# Patient Record
Sex: FEMALE | Race: WHITE
[De-identification: names, ages, dates, MRNs, and addresses within clinical notes are randomized per-mention and may not be internally consistent; named-entity substitution may affect disease eponyms.]

---

## 2021-04-20 ENCOUNTER — HOSPITAL ENCOUNTER (OUTPATIENT)
Dept: HOSPITAL 56 - MW.SDS | Age: 56
LOS: 1 days | Discharge: HOME | End: 2021-04-21
Attending: OBSTETRICS & GYNECOLOGY
Payer: COMMERCIAL

## 2021-04-20 DIAGNOSIS — F17.210: ICD-10-CM

## 2021-04-20 DIAGNOSIS — N93.8: ICD-10-CM

## 2021-04-20 DIAGNOSIS — N39.3: ICD-10-CM

## 2021-04-20 DIAGNOSIS — Z80.41: ICD-10-CM

## 2021-04-20 DIAGNOSIS — N81.2: Primary | ICD-10-CM

## 2021-04-20 DIAGNOSIS — Z98.890: ICD-10-CM

## 2021-04-20 LAB
BUN SERPL-MCNC: 14 MG/DL (ref 7–18)
CHLORIDE SERPL-SCNC: 107 MMOL/L (ref 98–107)
CO2 SERPL-SCNC: 29.8 MMOL/L (ref 21–32)
GLUCOSE SERPL-MCNC: 106 MG/DL (ref 74–106)
POTASSIUM SERPL-SCNC: 4.2 MMOL/L (ref 3.5–5.1)
SODIUM SERPL-SCNC: 143 MMOL/L (ref 136–145)

## 2021-04-20 PROCEDURE — 85027 COMPLETE CBC AUTOMATED: CPT

## 2021-04-20 PROCEDURE — C1771 REP DEV, URINARY, W/SLING: HCPCS

## 2021-04-20 PROCEDURE — 88307 TISSUE EXAM BY PATHOLOGIST: CPT

## 2021-04-20 PROCEDURE — 85025 COMPLETE CBC W/AUTO DIFF WBC: CPT

## 2021-04-20 PROCEDURE — 86901 BLOOD TYPING SEROLOGIC RH(D): CPT

## 2021-04-20 PROCEDURE — 36415 COLL VENOUS BLD VENIPUNCTURE: CPT

## 2021-04-20 PROCEDURE — 86850 RBC ANTIBODY SCREEN: CPT

## 2021-04-20 PROCEDURE — 80048 BASIC METABOLIC PNL TOTAL CA: CPT

## 2021-04-20 PROCEDURE — 58552 LAPARO-VAG HYST INCL T/O: CPT

## 2021-04-20 PROCEDURE — 88304 TISSUE EXAM BY PATHOLOGIST: CPT

## 2021-04-20 PROCEDURE — 84703 CHORIONIC GONADOTROPIN ASSAY: CPT

## 2021-04-20 PROCEDURE — 57240 ANTERIOR COLPORRHAPHY: CPT

## 2021-04-20 PROCEDURE — 57288 REPAIR BLADDER DEFECT: CPT

## 2021-04-20 PROCEDURE — 86900 BLOOD TYPING SEROLOGIC ABO: CPT

## 2021-04-20 RX ADMIN — OXYCODONE HYDROCHLORIDE AND ACETAMINOPHEN PRN TAB: 5; 325 TABLET ORAL at 19:53

## 2021-04-20 NOTE — PCM.POSTAN
POST ANESTHESIA ASSESSMENT





- MENTAL STATUS


Mental Status: Alert (no anesthetic problems), Oriented





- VITAL SIGNS


Vital Signs: 


                                Last Vital Signs











Temp  98.1 F   04/20/21 11:06


 


Pulse  81   04/20/21 11:46


 


Resp  14   04/20/21 11:46


 


BP  113/54 L  04/20/21 11:46


 


Pulse Ox  100   04/20/21 11:46














- RESPIRATORY


Respiratory Status: Respiratory Rate WNL, Airway Patent, O2 Saturation Stable





- CARDIOVASCULAR


CV Status: Pulse Rate WNL, Blood Pressure Stable





- GASTROINTESTINAL


GI Status: No Symptoms





- POST OP HYDRATION


Hydration Status: Adequate & Stable

## 2021-04-20 NOTE — PCM.PREANE
Preanesthetic Assessment





- Anesthesia/Transfusion/Family Hx


Anesthesia History: Prior Anesthesia Without Reaction


Family History of Anesthesia Reaction: No


Transfusion History: No Prior Transfusion(s)





- Review of Systems


General: No Symptoms


Pulmonary: No Symptoms


Cardiovascular: No Symptoms


Gastrointestinal: No Symptoms


Neurological: No Symptoms


Other: Reports: None





- Physical Assessment


NPO Status Date: 21


NPO Status Time: 00:01


Height: 5 ft 7 in


Weight: 166 lb


ASA Class: 2


Mental Status: Alert & Oriented x3


Airway Class: Mallampati = 2


Dentition: Reports: Normal Dentition


ROM/Head Extension: Full


Lungs: Clear to Auscultation, Normal Respiratory Effort


Cardiovascular: Regular Rate, Regular Rhythm





- Lab


Values: 





                             Laboratory Last Values











WBC  8.58 K/uL (4.0-11.0)   21  06:53    


 


RBC  4.59 M/uL (4.30-5.90)   21  06:53    


 


Hgb  15.3 g/dL (12.0-16.0)   21  06:53    


 


Hct  45.8 % (36.0-46.0)   21  06:53    


 


MCV  99.8 fL (80.0-98.0)  H  21  06:53    


 


MCH  33.3 pg (27.0-32.0)  H  21  06:53    


 


MCHC  33.4 g/dL (31.0-37.0)   21  06:53    


 


RDW Std Deviation  47.7 fl (28.0-62.0)   21  06:53    


 


RDW Coeff of Zora  13 % (11.0-15.0)   21  06:53    


 


Plt Count  268 K/uL (150-400)   21  06:53    


 


MPV  11.80 fL (7.40-12.00)   21  06:53    


 


Nucleated RBC %  0.0 /100WBC  21  06:53    


 


Nucleated RBCs #  0 K/uL  21  06:53    














- Allergies


Allergies/Adverse Reactions: 


                                    Allergies











Allergy/AdvReac Type Severity Reaction Status Date / Time


 


No Known Allergies Allergy   Verified 21 15:31














- Anesthesia Plan


Pre-Op Medication Ordered: None





- Acknowledgements


Anesthesia Type Planned: General Anesthesia


Pt an Appropriate Candidate for the Planned Anesthesia: Yes


Alternatives and Risks of Anesthesia Discussed w Pt/Guardian: Yes


Pt/Guardian Understands and Agrees with Anesthesia Plan: Yes


Additional Comments: 





npo after mn


tob  1/2 ppd


etoh  6 beers a weekend


no cv problems


par no questions


depression








PreAnesthesia Questionnaire


HEENT History: Reports: Other (See Below)


Other HEENT History: wears glasses/contacts,  has upper and lower removable 

partial dentures


Cardiovascular History: Reports: None


Respiratory History: Reports: None


Gastrointestinal History: Reports: None


Genitourinary History: Reports: None


OB/GYN History: Reports: Pregnancy


Musculoskeletal History: Reports: None


Neurological History: Reports: None


Psychiatric History: Reports: Depression


Endocrine/Metabolic History: Reports: None


Hematologic History: Reports: None


Immunologic History: Reports: None


Oncologic (Cancer) History: Reports: Basal Cell Carcinoma


Other Oncologic History: removed from chin





- Past Surgical History


Head Surgeries/Procedures: Reports: None


Cardiovascular Surgical History: Reports: None


Respiratory Surgical History: Reports: None


GI Surgical History: Reports: None


Female  Surgical History: Reports:  Section, Endometrial Ablation


Musculoskeletal Surgical History: Reports: None


Oncologic Surgical History: Reports: None


Dermatological Surgical History: Reports: Skin Biopsy





- SUBSTANCE USE


Tobacco Use Status *Q: Current Some Day Tobacco User


Tobacco Use Within Last Twelve Months: Cigarettes


Recreational Drug Use History: No





- HOME MEDS


Home Medications: 


                                    Home Meds





FLUoxetine [PROzac] 40 mg PO QAM 21 [History]











- CURRENT (IN HOUSE) MEDS


Current Meds: 





                               Current Medications





Lactated Ringer's (Ringers, Lactated)  1,000 mls @ 125 mls/hr IV ASDIRECTED YUNIEL


Sodium Chloride (Sodium Chloride 0.9% 10 Ml Syringe)  10 ml FLUSH ASDIRECTED PRN


   PRN Reason: Keep Vein Open


Sodium Chloride (Sodium Chloride 0.9% 2.5 Ml Syringe)  2.5 ml FLUSH ASDIRECTED 

PRN


   PRN Reason: Keep Vein Open


Sodium Chloride (Sodium Chloride 0.9% 10 Ml Sdv)  10 ml IV ASDIRECTED PRN


   PRN Reason: IV Use





Discontinued Medications





Dexamethasone (Dexamethasone 4 Mg/Ml 5 Ml Mdv) Confirm Administered Dose 20 mg 

.ROUTE .STK-MED ONE


   Stop: 21 07:02


Fentanyl (Fentanyl 250 Mcg/5 Ml Sdv) Confirm Administered Dose 250 mcg .ROUTE 

.STK-MED ONE


   Stop: 21 07:01


Cefazolin Sodium/Dextrose 2 gm (/ Premix)  50 mls @ 100 mls/hr IV ONETIME ONE


   Stop: 21 06:29


Lidocaine (Lidocaine 2% 5 Ml Sdv) Confirm Administered Dose 5 ml .ROUTE .STK-MED

 ONE


   Stop: 21 07:02


Midazolam HCl (Midazolam 1 Mg/Ml 2 Ml Sdv) Confirm Administered Dose 2 mg .ROUTE

 .STK-MED ONE


   Stop: 21 07:01


Ondansetron HCl (Ondansetron 4 Mg/2 Ml Sdv) Confirm Administered Dose 4 mg 

.ROUTE .STK-MED ONE


   Stop: 21 07:02


Propofol (Propofol 200 Mg/20 Ml Sdv) Confirm Administered Dose 200 mg .ROUTE 

.STK-MED ONE


   Stop: 21 07:01


Rocuronium Bromide (Rocuronium Bromide 50 Mg/5 Ml Syringe) Confirm Administered 

Dose 50 mg .ROUTE .STK-MED ONE


   Stop: 21 07:02

## 2021-04-21 LAB
BUN SERPL-MCNC: 7 MG/DL (ref 7–18)
CHLORIDE SERPL-SCNC: 105 MMOL/L (ref 98–107)
CO2 SERPL-SCNC: 29.8 MMOL/L (ref 21–32)
GLUCOSE SERPL-MCNC: 112 MG/DL (ref 74–106)
POTASSIUM SERPL-SCNC: 4.5 MMOL/L (ref 3.5–5.1)
SODIUM SERPL-SCNC: 141 MMOL/L (ref 136–145)

## 2021-04-21 RX ADMIN — OXYCODONE HYDROCHLORIDE AND ACETAMINOPHEN PRN TAB: 5; 325 TABLET ORAL at 05:28

## 2021-04-21 NOTE — OR
SURGEON:

Kit Hernandez MD

 

DATE OF PROCEDURE:  04/20/2021

 

INDICATION FOR PROCEDURE:

A 55-year-old female with history of stress urinary incontinence, cystocele

and uterine prolapse, presenting for scheduled surgery.  The patient has a

long history of stress incontinence, was evaluated in the office with cystometry

and confirmed to be a good candidate for a TOT sling.  She also had stage II

cystocele and stage I uterine prolapse. She has family history of ovarian

cancer with her mother at age 50s and she has been perimenopausal.  She 

desired to have a hysterectomy and BSO after discussion of risks and benefits.  

Attempted to perform endometrial biopsy in the office but was not able to due

to severe cervical stenosis.

 

PREOPERATIVE DIAGNOSES:

1. Stress incontinence.

2. Cystocele.

3. Family history of ovarian cancer.

 

POSTOPERATIVE DIAGNOSES:

1. Stress incontinence.

2. Cystocele.

3. Family history of ovarian cancer.

 

PROCEDURES PERFORMED:

Laparoscopic-assisted vaginal hysterectomy, bilateral salpingo-oophorectomy,

cystocele repair, transobturator vaginal tape, and cystoscopy.

 

ANESTHESIA:

General anesthesia.

 

ANESTHESIOLOGIST:

Dr. Kishan Price

 

ASSISTANT:

Tameka Bar M.D.

 

FINDINGS:

Normal-appearing uterus, fallopian tubes, and ovaries.  Stage II cystocele and

stage I uterine prolapse.

 

DESCRIPTION OF THE PROCEDURE:

Informed consent was obtained.  Risks of procedure including bleeding,

infection, DVTs, injury to surrounding organs such as bladder and bowel were

discussed.  The patient was taken to the operating room.  She was given 2 g

Ancef IV for infection prophylaxis.  SCD was placed.  She underwent general

anesthesia with no complications.  She was placed in dorsal lithotomy

position and her legs supported using stirrups careful to relieve all pressure 
points.

She was prepped and draped in the usual sterile fashion with chlorhexidine and

Betadine.  Clark catheter was placed.  Bimanual exam showed small anteverted

uterus measuring less than 6-week size.  No adnexal masses were felt.  A bivalve

speculum was placed in the vaginal vault to visualize the cervix.  It was

grasped with a single-tooth tenaculum and the cervical os was serially dilated 
with

Hegar dilators to 6 mm.  A HUMI uterine manipulator was placed in the uterus for

manipulation.  The tenaculum was removed.  

Attention was then turned to the abdomen.  A 5 mm incision was made 

at the umbilicus after injecting with 0.25% Marcaine for local anesthesia.  

A Veress needle was used for entry. Intraperitoneal location was confirmed in 

the usual fashion with saline drop test and low opening pressure.  CO2 gas 

was initiated and pneumoperitoneum to 15 mmHg was achieved.  A 5 mm trocar 

was placed under laparoscopic visualization. Intraperitoneal location was 
confirmed.

No visceral or vascular injury was noted at the entry site. Survey of the 
abdomen 

noted normal-appearing uterus, bilateral fallopian tubes and ovaries.  A 5 mm 

trocar was then placed in the right lower quadrant under laparoscopic 
visualization, 

followed by another 5 mm trocar in the left lower quadrant.  The patient was 

placed in Trendelenburg.  Blunt probe was used to sweep the bowel away from 

the pelvis.  Both ureters were noted to be peristalsing along the pelvic wall.  

The left fallopian tube was grasped with atraumatic grasper at the fimbriated 
end.  

The IP ligament was coagulated and transected with LigaSure device.  The 

fallopian tube was then  from the mesosalpinx with the LigaSure device.
 

The round ligament was cauterized and transected.  The broad ligament was 
dissected

towards the cervix until the level of the cardinal ligaments.  A bladder flap 
was then

carefully made by dissecting the anterior vesicouterine peritoneum and

 the bladder away from the underlying pubocervical fascia.  The same

procedure was performed on the right side.  Hemostasis was confirmed.  

Attention was then turned to the vaginal portion of the procedure.  A weighted 
speculum

was placed in the posterior vaginal vault and right angle retractors used for 
the 

vaginal walls.  The cervix was grasped with 2 David clamps.  A circumferential 

incision was made with Bovie cautery at the cervicovaginal junction with 

downward traction.  The vagina was then bluntly dissected away from the cervix. 


The anterior peritoneum was visualized and carefully dissected with Metzenbaum 

scissors to enter the peritoneal cavity and the location confirmed by palpation.
 

The peritoneum was also carefully dissected in the posterior cul-de-sac and 

entered with Metzenbaum scissors.  A gooseneck speculum was placed 

in the cul-de-sac.  The uterosacral ligament was clamped with Ana clamp, 

then cut and ligated with 2-0 Vicryl suture bilaterally.  There was some 

attachment on the right side of the uterus that was grasped with Ana 

clamps and ligated.  The uterus was completely freed up and brought out of 

the vagina and sent to Pathology. The pedicles were carefully examined and 

noted to be hemostatic.  

The cystocele repair was then performed.  Two Allis clamps were placed at the

anterior edge of the vaginal cuff.  Saline was used for hydrodissection along 

the midline of the vaginal mucosa. A vertical incision was made.  The bladder 

was carefully dissected away from the vaginal mucosa bilaterally with blunt 
dissection 

and Metzenbaum scissors. The cystocele was then reduced by bringing the 
underlying 

connective tissue together at the midline using interrupted 2-0 Vicryl. 1 cm 
width of 

redundant mucosa was trimmed on both sides of the vagina.  The vaginal incision 
was 

closed with 0 Vicryl suture in running locking fashion.  The suture that was 

previously used to ligate the uterosacral ligament was attached to the lateral 

vaginal cuff with a figure of eight stitch for pelvic support.  The vaginal cuff


was then closed entirely and hemostasis was confirmed.  A cystoscopy was 

performed after injection of fluorescein and lasix.  The bladder was normal 

appearing without any signs of injury and both ureteral jets were visualized.  

The abdomen was again insufflated with CO2.  The pelvis was copiously

irrigated. The vaginal cuff and all pedicles were examined and noted to be 

hemostatic.  Pneumoperitoneum was relieved, and all instruments

were removed.  

The transobturator sling was then performed.  0.5cm incision was

made bilaterally on the lateral thigh below the tendon of the obturator longus 
muscle 

above at the obturator foramen. A mixture of 1% lidocaine and 0.25% Marcaine 
with

saline was used for anesthesia using a spinal needle and injected along the 

obturator canal at the route of the transobturator sling.  A 1cm vaginal 
incision was 

made after performing a hydrodissection along the vaginal mucosa.  It was then 

carefully dissected toward the obturator foramen with Metzenbaum scissors 

and palpated digitally for proper position.  The curved needle was passed from 

the groin incision through the obturator canal and the vaginal incision under 

direct palpation.  The mesh was placed on the needle and brought out from the 

thigh incisions.  The same procedure was performed bilaterally.  The mesh was 

carefully examined and made sure it was lying flat.  The tension of the sling 
was 

then adjusted so there was a small amount of space under the urethra.  The 
bladder 

was then backfilled with a solution with Neosporin. Valsalva and suprapubic 
pressure was

performed and there was no incontinence noted.  The mesh was then

irrigated with the Neosporin solution.  The excess mesh was trimmed.  The

groin incisions were closed with Dermabond.  The vaginal incision was closed

with 2-0 Vicryl suture in running fashion.  Cystoscopy was again performed and 

there was no bladder injury noted.  The Clark catheter was replaced and vaginal 

packing was placed.

The laparoscopic incisions were then closed with 3-0 monocryl in usual fashion.

Dermabond was placed over the incision.  The patient tolerated the procedure

well.  She did have episodes of bradycardia during the procedure, but recovered 

after position was adjusted.  She was awakened from anesthesia and returned 

to the recovery room in stable condition.

 

 

YUMIKO SARAH

DD:  04/21/2021 00:35:11

DT:  04/21/2021 05:40:24

Job #:  [id number]

MTDD

## 2021-04-21 NOTE — PCM.PN
- General Info


Date of Service: 04/21/21


Functional Status: Reports: Pain Controlled, Tolerating Diet, Ambulating





- Review of Systems


General: Reports: No Symptoms


HEENT: Reports: No Symptoms


Pulmonary: Reports: No Symptoms


Cardiovascular: Reports: No Symptoms


Gastrointestinal: Reports: No Symptoms


Genitourinary: Reports: No Symptoms


Musculoskeletal: Reports: No Symptoms


Skin: Reports: No Symptoms


Neurological: Reports: No Symptoms


Psychiatric: Reports: No Symptoms





- Patient Data


Vitals - Most Recent: 


                                Last Vital Signs











Temp  36.5 C   04/21/21 05:15


 


Pulse  74   04/21/21 05:15


 


Resp  16   04/21/21 05:15


 


BP  122/66   04/21/21 05:15


 


Pulse Ox  95   04/21/21 05:15











Weight - Most Recent: 166 lb


I&O - Last 24 Hours: 


                                 Intake & Output











 04/20/21 04/21/21 04/21/21





 22:59 06:59 14:59


 


Intake Total 850  


 


Output Total 450 750 


 


Balance 400 -750 











Lab Results Last 24 Hours: 


                         Laboratory Results - last 24 hr











  04/21/21 04/21/21 Range/Units





  05:50 05:50 


 


WBC  12.83 H   (4.0-11.0)  K/uL


 


RBC  3.75 L   (4.30-5.90)  M/uL


 


Hgb  12.2   (12.0-16.0)  g/dL


 


Hct  38.0   (36.0-46.0)  %


 


MCV  101.3 H   (80.0-98.0)  fL


 


MCH  32.5 H   (27.0-32.0)  pg


 


MCHC  32.1   (31.0-37.0)  g/dL


 


RDW Std Deviation  48.5   (28.0-62.0)  fl


 


RDW Coeff of Zora  13   (11.0-15.0)  %


 


Plt Count  235   (150-400)  K/uL


 


MPV  11.60   (7.40-12.00)  fL


 


Neut % (Auto)  65.1   (48.0-80.0)  %


 


Lymph % (Auto)  28.4   (16.0-40.0)  %


 


Mono % (Auto)  6.2   (0.0-15.0)  %


 


Eos % (Auto)  0.2   (0.0-7.0)  %


 


Baso % (Auto)  0.1   (0.0-1.5)  %


 


Neut # (Auto)  8.4 H   (1.4-5.7)  K/uL


 


Lymph # (Auto)  3.6 H   (0.6-2.4)  K/uL


 


Mono # (Auto)  0.8   (0.0-0.8)  K/uL


 


Eos # (Auto)  0.0   (0.0-0.7)  K/uL


 


Baso # (Auto)  0.0   (0.0-0.1)  K/uL


 


Nucleated RBC %  0.0   /100WBC


 


Nucleated RBCs #  0   K/uL


 


Sodium   141  (136-145)  mmol/L


 


Potassium   4.5  (3.5-5.1)  mmol/L


 


Chloride   105  ()  mmol/L


 


Carbon Dioxide   29.8  (21.0-32.0)  mmol/L


 


BUN   7  (7.0-18.0)  mg/dL


 


Creatinine   0.8  (0.6-1.0)  mg/dL


 


Est Cr Clr Drug Dosing   78.18  mL/min


 


Estimated GFR (MDRD)   > 60.0  ml/min


 


Glucose   112 H  ()  mg/dL


 


Calcium   8.1 L  (8.5-10.1)  mg/dL











Med Orders - Current: 


                               Current Medications





Lactated Ringer's (Ringers, Lactated)  1,000 mls @ 125 mls/hr IV ASDIRECTED [facility]


   Last Admin: 04/20/21 12:13 Dose:  125 mls/hr


   Documented by: 


Ketorolac Tromethamine (Ketorolac 30 Mg/Ml Sdv)  30 mg IVPUSH Q6H PRN


   PRN Reason: Pain (severe 7-10)


   Stop: 04/25/21 11:13


Morphine Sulfate (Morphine 4 Mg/Ml Syringe)  4 mg IVPUSH Q2H PRN


   PRN Reason: Pain (severe 7-10)


Ondansetron HCl (Ondansetron 4 Mg/2 Ml Sdv)  4 mg IVPUSH Q6H PRN


   PRN Reason: Nausea/Vomiting


Oxycodone/Acetaminophen (Acetaminophen/Oxycodone 325-5 Mg Tab)  1 tab PO Q4H PRN


   PRN Reason: Pain (moderate 4-6)


   Last Admin: 04/21/21 05:28 Dose:  1 tab


   Documented by: 


Oxycodone/Acetaminophen (Acetaminophen/Oxycodone 325-5 Mg Tab)  2 tab PO Q4H PRN


   PRN Reason: Pain (moderate 4-6)


Promethazine HCl (Promethazine 25 Mg/Ml Sdv)  25 mg IM Q6H PRN


   PRN Reason: Nausea/Vomiting


Sodium Chloride (Sodium Chloride 0.9% 10 Ml Syringe)  10 ml FLUSH ASDIRECTED PRN


   PRN Reason: Keep Vein Open


Sodium Chloride (Sodium Chloride 0.9% 2.5 Ml Syringe)  2.5 ml FLUSH ASDIRECTED 

PRN


   PRN Reason: Keep Vein Open


Sodium Chloride (Sodium Chloride 0.9% 10 Ml Sdv)  10 ml IV ASDIRECTED PRN


   PRN Reason: IV Use





Discontinued Medications





Bupivacaine HCl (Bupivacaine 0.25% 10 Ml Sdv) Confirm Administered Dose 20 ml 

.ROUTE .[facility]-MED ONE


   Stop: 04/20/21 07:53


Dexamethasone (Dexamethasone 4 Mg/Ml 5 Ml Mdv) Confirm Administered Dose 20 mg 

.ROUTE .ST[facility]-MED ONE


   Stop: 04/20/21 07:02


Fentanyl (Fentanyl 250 Mcg/5 Ml Sdv) Confirm Administered Dose 250 mcg .ROUTE 

.STK-MED ONE


   Stop: 04/20/21 07:01


Fentanyl (Fentanyl 100 Mcg/2 Ml Sdv)  50 mcg IVPUSH Q10M PRN


   PRN Reason: Pain


Fluorescein Sodium (Fluorescein 5 Ml Vial) Confirm Administered Dose 5 ml .ROUTE

.STK-MED ONE


   Stop: 04/20/21 07:53


Furosemide (Furosemide 40 Mg/4 Ml Vial) Confirm Administered Dose 40 mg .ROUTE 

.STK-MED ONE


   Stop: 04/20/21 09:56


Hydromorphone HCl (Hydromorphone 2 Mg/Ml Syringe) Confirm Administered Dose 2 mg

.ROUTE .STK-MED ONE


   Stop: 04/20/21 09:36


Cefazolin Sodium/Dextrose 2 gm (/ Premix)  50 mls @ 100 mls/hr IV ONETIME ONE


   Stop: 04/20/21 06:29


Cefazolin Sodium/Dextrose (Ancef 2 Gm/50 Ml) Confirm Administered Dose 50 mls @ 

as directed .ROUTE .[facility]-MED ONE


   Stop: 04/20/21 07:19


Acetaminophen 1,000 mg/ Premix  100 mls @ 400 mls/hr IV NOW ONE


   Stop: 04/20/21 11:39


   Last Admin: 04/20/21 11:29 Dose:  400 mls/hr


   Documented by: 


Acetaminophen (Ofirmev 1000 Mg/100 Ml) Confirm Administered Dose 100 mls @ as 

directed .ROUTE .[facility] ONE


   Stop: 04/20/21 11:27


Ketorolac Tromethamine (Ketorolac 30 Mg/Ml Sdv) Confirm Administered Dose 30 mg 

.ROUTE .[facility] ONE


   Stop: 04/20/21 10:47


Lidocaine (Lidocaine 2% 5 Ml Sdv) Confirm Administered Dose 5 ml .ROUTE .[facility]-MED

ONE


   Stop: 04/20/21 07:02


Lidocaine/Epinephrine (Lidocaine 1% With Epinephrine 1:100,000 20 Ml Mdv) 

Confirm Administered Dose 20 ml .ROUTE .[facility]-MED ONE


   Stop: 04/20/21 10:06


Methylene Blue (Methylene Blue 50 Mg/10 Ml Ampule) Confirm Administered Dose 0 

mg .ROUTE .[facility] ONE


   Stop: 04/20/21 07:53


Midazolam HCl (Midazolam 1 Mg/Ml 2 Ml Sdv) Confirm Administered Dose 2 mg .ROUTE

.[facility] ONE


   Stop: 04/20/21 07:01


Neomycin/Polymyxin (Neomycin/Polymyxin B Bladder Irrigation 1 Ml Amp) Confirm 

Administered Dose 1 ml .ROUTE .[facility] ONE


   Stop: 04/20/21 10:06


Octyl Cyanoacrylate (Octyl 2-Cyanoacrylate 1 Tube) Confirm Administered Dose 1 

applic .ROUTE .[facility] ONE


   Stop: 04/20/21 07:53


Octyl Cyanoacrylate (Octyl 2-Cyanoacrylate 1 Tube) Conf[facility] Administ[facility] D[facility] 1 

[facility]


   Stop: 04/20/21 10:40


Ondansetron HCl (Ondansetron 4 Mg/2 Ml Sdv) Confirm Administered Dose 4 mg 

.ROUTE .[facility] ONE


   Stop: 04/20/21 07:02


Propofol (Propofol 200 Mg/20 Ml Sdv) Confirm Administered D[facility] 200 [facility]


   Stop: 04/20/21 07:01


Rocuronium Bromide (Rocuronium Bromide 50 Mg/5 Ml Syringe) Confirm Administered 

Dose 50 mg .ROUTE .[facility] ONE


   Stop: 04/20/21 07:02


Sugammadex Sodium (Sugammadex Sodium 200 Mg/2 Ml Vial) Confirm Administered Dose

200 mg .ROUTE .[facility]-MED ONE


   Stop: 04/20/21 10:47











- Exam


General: Alert, Oriented, Cooperative, No Acute Distress


HEENT: Pupils Equal, Pupils Reactive


Neck: Supple, Trachea Midline, No JVD


Lungs: Clear to Auscultation, Normal Respiratory Effort


Cardiovascular: Regular Rate, Regular Rhythm, No Murmurs


GI/Abdominal Exam: Normal Bowel Sounds, Soft, Non-Tender, No Distention


 (Female) Exam: Normal External Exam, Other (vaginal packing removed)


Back Exam: Normal Inspection


Extremities: Normal Inspection, Normal Range of Motion, Non-Tender, No Pedal 

Edema


Skin: Warm, Dry, Intact


Wound/Incisions: Healing Well


Neurological: No New Focal Deficit


Psy/Mental Status: Alert, Normal Affect, Normal Mood





- Patient Data


Lab Results Last 24 hrs: 


                         Laboratory Results - last 24 hr











  04/21/21 04/21/21 Range/Units





  05:50 05:50 


 


WBC  12.83 H   (4.0-11.0)  K/uL


 


RBC  3.75 L   (4.30-5.90)  M/uL


 


Hgb  12.2   (12.0-16.0)  g/dL


 


Hct  38.0   (36.0-46.0)  %


 


MCV  101.3 H   (80.0-98.0)  fL


 


MCH  32.5 H   (27.0-32.0)  pg


 


MCHC  32.1   (31.0-37.0)  g/dL


 


RDW Std Deviation  48.5   (28.0-62.0)  fl


 


RDW Coeff of Zora  13   (11.0-15.0)  %


 


Plt Count  235   (150-400)  K/uL


 


MPV  11.60   (7.40-12.00)  fL


 


Neut % (Auto)  65.1   (48.0-80.0)  %


 


Lymph % (Auto)  28.4   (16.0-40.0)  %


 


Mono % (Auto)  6.2   (0.0-15.0)  %


 


Eos % (Auto)  0.2   (0.0-7.0)  %


 


Baso % (Auto)  0.1   (0.0-1.5)  %


 


Neut # (Auto)  8.4 H   (1.4-5.7)  K/uL


 


Lymph # (Auto)  3.6 H   (0.6-2.4)  K/uL


 


Mono # (Auto)  0.8   (0.0-0.8)  K/uL


 


Eos # (Auto)  0.0   (0.0-0.7)  K/uL


 


Baso # (Auto)  0.0   (0.0-0.1)  K/uL


 


Nucleated RBC %  0.0   /100WBC


 


Nucleated RBCs #  0   K/uL


 


Sodium   141  (136-145)  mmol/L


 


Potassium   4.5  (3.5-5.1)  mmol/L


 


Chloride   105  ()  mmol/L


 


Carbon Dioxide   29.8  (21.0-32.0)  mmol/L


 


BUN   7  (7.0-18.0)  mg/dL


 


Creatinine   0.8  (0.6-1.0)  mg/dL


 


Est Cr Clr Drug Dosing   78.18  mL/min


 


Estimated GFR (MDRD)   > 60.0  ml/min


 


Glucose   112 H  ()  mg/dL


 


Calcium   8.1 L  (8.5-10.1)  mg/dL











Result Diagrams: 


                                 04/21/21 05:50





                                 04/21/21 05:50





Sepsis Event Note





- Evaluation


Sepsis Screening Result: No Definite Risk





- Focused Exam


Vital Signs: 


                                   Vital Signs











  Temp Pulse Resp BP Pulse Ox


 


 04/21/21 05:15  36.5 C  74  16  122/66  95


 


 04/21/21 00:00  36.6 C  77  16  120/60  94 L














- Problem List Review


Problem List Initiated/Reviewed/Updated: Yes





- My Orders


Last 24 Hours: 


My Active Orders





04/20/21 11:13


Antiembolic Devices [RC] PER UNIT ROUTINE 


Notify Provider Intake and Out [RC] ASDIRECTED 


Notify Provider Vital Signs [RC] ASDIRECTED 


Oxygen Therapy [RC] ASDIRECTED 


RT Incentive Spirometry [RC] Q2HWA 


Up With Assistance [RC] PER UNIT ROUTINE 


Up ad Sun [RC] PER UNIT ROUTINE 


Urinary Catheter Removal [RC] Per Unit Routine 


Acetaminophen/oxyCODONE [Percocet 325-5 MG]   1 tab PO Q4H PRN 


Acetaminophen/oxyCODONE [Percocet 325-5 MG]   2 tab PO Q4H PRN 


Ketorolac [Toradol]   30 mg IVPUSH Q6H PRN 


Morphine   4 mg IVPUSH Q2H PRN 


Ondansetron [Zofran]   4 mg IVPUSH Q6H PRN 


Promethazine [Phenergan]   25 mg IM Q6H PRN 


Peripheral IV Discontinue [OM.PC] Routine 


Sequential Compression Device [OM.PC] Per Unit Routine 


Resuscitation Status Routine 





04/20/21 11:14


Remove Vaginal Packing [OM.PC] Per Unit Routine 





04/20/21 Dinner


Regular Diet [DIET] 














- Assessment


Assessment:: 





56yo POD1 s/p LAVH, BSO, cystocele repair, TOT sling and cystoscopy. Stable and 

recovering well.





- Plan


Plan:: 





- vital stable


- ambulating and tolerating PO, pain is controlled


- Hgb 12.1 today, bleeding light, vaginal packing removed


- bladder instilled with 300cc sterile water, voiding trial this AM. If not able

 to void, will discharge with miles catheter


Reviewed postop care instructions, plan for discharge home today

## 2021-04-21 NOTE — [id number]
Post Anesthesia Note





- EVALUATION WITHIN 48HRS OF ANESTHETIC


Vital Signs in Normal Range: Yes


Patient Participated in Evaluation: Yes


Respiratory Function Stable: Yes


Airway Patent: Yes


Cardiovascular Function Stable: Yes


Hydration Status Stable: Yes


Pain Control Satisfactory: Yes


Nausea and Vomiting Control Satisfactory: Yes


Mental Status Recovered: Yes


Vital Signs: 


                                Last Vital Signs











Temp  97.7 F   04/21/21 05:15


 


Pulse  74   04/21/21 05:15


 


Resp  16   04/21/21 05:15


 


BP  122/66   04/21/21 05:15


 


Pulse Ox  95   04/21/21 05:15